# Patient Record
Sex: MALE | Race: WHITE
[De-identification: names, ages, dates, MRNs, and addresses within clinical notes are randomized per-mention and may not be internally consistent; named-entity substitution may affect disease eponyms.]

---

## 2021-11-28 ENCOUNTER — HOSPITAL ENCOUNTER (EMERGENCY)
Dept: HOSPITAL 43 - DL.ED | Age: 41
Discharge: HOME | End: 2021-11-28
Payer: COMMERCIAL
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
## 2021-11-28 DIAGNOSIS — S01.511A: Primary | ICD-10-CM

## 2021-11-28 NOTE — EDM.PDOC
ED HPI GENERAL MEDICAL PROBLEM





- General


Stated Complaint: SPLIT UPPER LIP


Time Seen by Provider: 11/28/21 13:43


Source of Information: Reports: Patient, RN, RN Notes Reviewed


History Limitations: Reports: No Limitations





- History of Present Illness


INITIAL COMMENTS - FREE TEXT/NARRATIVE: 


Tisha is a 42 y/o male who presents to the ED via personal vehicle with 

complaints of laceration to his right upper lip.  The patient reports 

approximately 20 minutes ago he was using a power drill that jumped back and 

struck him in the face.  He denies loss of consciousness during the event.  The 

patient is current on tetanus vaccine. 








- Related Data


                                    Allergies











Allergy/AdvReac Type Severity Reaction Status Date / Time


 


No Known Allergies Allergy   Verified 11/28/21 13:43














Past Medical History





- Past Health History


Medical/Surgical History: Denies Medical/Surgical History





Social & Family History





- Family History


Family Medical History: No Pertinent Family History





- Caffeine Use


Caffeine Use: Reports: None





- Living Situation & Occupation


Living situation: Reports: , with Family





ED ROS GENERAL





- Review of Systems


Review Of Systems: Comprehensive ROS is negative, except as noted in HPI.





ED EXAM, SKIN/RASH


Exam: See Below


Exam Limited By: No Limitations


General Appearance: Alert, No Apparent Distress


Eye Exam: Bilateral Eye: EOMI, Normal Inspection, PERRL (3mm)


Ears: Normal External Exam, Hearing Grossly Normal


Throat/Mouth: Normal Lips (Swelling to right upper lip), Normal Oropharynx, 

Normal Voice, No Airway Compromise, Other (Laceration to right philtral crest)


Head: Atraumatic, Normocephalic


Neck: Normal Inspection, Supple, Non-Tender, Full Range of Motion.  No: 

Lymphadenopathy (L), Lymphadenopathy (R)


Respiratory/Chest: No Respiratory Distress, Lungs Clear, Chest Non-Tender


Cardiovascular: Normal Peripheral Pulses, Regular Rate, Rhythm, No Gallop, No 

Murmur, No Rub


Peripheral Pulses: 2+: Radial (L), Radial (R)


GI/Abdominal: Normal Bowel Sounds, Soft, Non-Tender, No Distention, No Abnormal 

Bruit, No Mass, Pelvis Stable


 (Male) Exam: Deferred


Rectal (Males) Exam: Deferred


Back Exam: Normal Inspection, Full Range of Motion


Extremities: Normal Inspection, Normal Range of Motion, Normal Capillary Refill


Neurological: Alert, Oriented, CN II-XII Intact, Normal Cognition, Normal Gait, 

No Motor/Sensory Deficits


Psychiatric: Normal Affect, Normal Mood


Skin: Warm, Dry, Normal Color, No Rash, Wound/Incision (Laceration: See above). 

No: Cyanosis, Jaundice, Mottled, Pallor


Location, Skin: Face


Associated features: Tenderness





ED SKIN PROCEDURES





- Laceration/Wound Repair


  ** Right Upper Anterior Medial Mouth


Appearance: Superficial, Irregular, Clean


Distal NVT: Neuro & Vascular Intact, No Tendon Injury


Anesthetic Type: Local


Local Anesthesia - Lidocaine (Xylocaine): 1% Plain


Local Anesthetic Volume: 2cc


Skin Prep: Chlorhexidine (Hibiciens), Saline, Sterile Drape


Saline Irrigation (cc's): 5


Exploration/Debridement/Repair: Wound Explored, Explored to Base, No Foreign 

Material Found, Wound Margins Revised


Closed with: Sutures


Lac/Wound length In cm: 1


Suture Size: 4-0


# of Sutures: 3


Suture Type: Prolene, Interrupted, Simple


Drain Placement: No


Sterile Dressing Applied: Nurse


Tetanus Status Addressed: Yes


Complications: No





Course





- Vital Signs


Last Recorded V/S: 


                                Last Vital Signs











Temp  98.7 F   11/28/21 13:40


 


Pulse  92   11/28/21 13:40


 


Resp  14   11/28/21 13:40


 


BP  154/100 H  11/28/21 13:40


 


Pulse Ox  95   11/28/21 13:40














- Orders/Labs/Meds


Meds: 


Medications














Discontinued Medications














Generic Name Dose Route Start Last Admin





  Trade Name Wesleyq  PRN Reason Stop Dose Admin


 


Bacitracin  1 dose  11/28/21 13:44  11/28/21 13:49





  Bacitracin Oint 1 Gm U/D Packet  TOP  11/28/21 13:45  1 dose





  ONETIME ONE   Administration


 


Lidocaine HCl  30 ml  11/28/21 13:44  11/28/21 13:49





  Lidocaine 1% 30 Ml Sdv  INJECT  11/28/21 13:45  10 ml





  ONETIME ONE   Administration














- Re-Assessments/Exams


Free Text/Narrative Re-Assessment/Exam: 





11/28/21 


Laceration repaired without complications.  Supportive cares, as well as red 

flag signs and symptoms which would warrant reevaluation, reviewed with patient.

 Patient verbalized understanding and agreement with the plan of care. 





Departure





- Departure


Time of Disposition: 14:15


Disposition: Home, Self-Care 01


Condition: Good


Clinical Impression: 


Facial laceration


Qualifiers:


 Encounter type: initial encounter Qualified Code(s): S01.81XA - Laceration 

without foreign body of other part of head, initial encounter








- Discharge Information


*PRESCRIPTION DRUG MONITORING PROGRAM REVIEWED*: Not Applicable


*COPY OF PRESCRIPTION DRUG MONITORING REPORT IN PATIENT MARCO A: Not Applicable


Instructions:  Facial Laceration


Referrals: 


PCP,None [Primary Care Provider] - 


Forms:  ED Department Discharge


Additional Instructions: 


1.) Keep sutures clean and dry, no need to keep wound covered if drainage or 

bleeding have stopped. 


2.) Sutures should be removed in five days at any primary care facility. 


3.) Avoid shaving the area. 


4.) Monitor for signs of increased redness, drainage, swelling, or pain, which 

may indicate infection; follow up with primary care or return to the emergency 

department with such symptoms. 





Sepsis Event Note (ED)





- Evaluation


Sepsis Screening Result: No Definite Risk